# Patient Record
Sex: MALE | Race: AMERICAN INDIAN OR ALASKA NATIVE
[De-identification: names, ages, dates, MRNs, and addresses within clinical notes are randomized per-mention and may not be internally consistent; named-entity substitution may affect disease eponyms.]

---

## 2017-07-15 ENCOUNTER — HOSPITAL ENCOUNTER (EMERGENCY)
Dept: HOSPITAL 42 - ED | Age: 27
LOS: 1 days | Discharge: HOME | End: 2017-07-16
Payer: COMMERCIAL

## 2017-07-15 VITALS — BODY MASS INDEX: 33 KG/M2
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
## 2017-07-15 DIAGNOSIS — S39.011A: Primary | ICD-10-CM

## 2017-07-15 PROCEDURE — 87086 URINE CULTURE/COLONY COUNT: CPT

## 2017-07-15 PROCEDURE — 99283 EMERGENCY DEPT VISIT LOW MDM: CPT

## 2017-07-15 PROCEDURE — 74176 CT ABD & PELVIS W/O CONTRAST: CPT

## 2017-07-15 PROCEDURE — 81001 URINALYSIS AUTO W/SCOPE: CPT

## 2017-07-15 PROCEDURE — 80053 COMPREHEN METABOLIC PANEL: CPT

## 2017-07-15 PROCEDURE — 85025 COMPLETE CBC W/AUTO DIFF WBC: CPT

## 2017-07-16 VITALS
HEART RATE: 68 BPM | TEMPERATURE: 98.3 F | DIASTOLIC BLOOD PRESSURE: 96 MMHG | RESPIRATION RATE: 17 BRPM | SYSTOLIC BLOOD PRESSURE: 130 MMHG | OXYGEN SATURATION: 99 %

## 2017-07-16 LAB
ALBUMIN SERPL-MCNC: 4.9 G/DL (ref 3–4.8)
ALBUMIN/GLOB SERPL: 1 {RATIO} (ref 1.1–1.8)
ALT SERPL-CCNC: 50 U/L (ref 7–56)
APPEARANCE UR: CLEAR
AST SERPL-CCNC: 43 U/L (ref 15–59)
BASOPHILS # BLD AUTO: 0.01 K/MM3 (ref 0–2)
BASOPHILS NFR BLD: 0.2 % (ref 0–3)
BILIRUB UR-MCNC: NEGATIVE MG/DL
BUN SERPL-MCNC: 17 MG/DL (ref 7–21)
CALCIUM SERPL-MCNC: 9.8 MG/DL (ref 8.4–10.5)
COLOR UR: YELLOW
EOSINOPHIL # BLD: 0.3 10*3/UL (ref 0–0.7)
EOSINOPHIL NFR BLD: 3.9 % (ref 1.5–5)
EPI CELLS #/AREA URNS HPF: (no result) /HPF (ref 0–5)
ERYTHROCYTE [DISTWIDTH] IN BLOOD BY AUTOMATED COUNT: 13.9 % (ref 11.5–14.5)
GFR NON-AFRICAN AMERICAN: > 60
GLUCOSE UR STRIP-MCNC: NEGATIVE MG/DL
GRANULOCYTES # BLD: 2.96 10*3/UL (ref 1.4–6.5)
GRANULOCYTES NFR BLD: 45.9 % (ref 50–68)
HGB BLD-MCNC: 13.5 GM/DL (ref 14–18)
LEUKOCYTE ESTERASE UR-ACNC: NEGATIVE LEU/UL
LYMPHOCYTES # BLD: 2.8 10*3/UL (ref 1.2–3.4)
LYMPHOCYTES NFR BLD AUTO: 42.7 % (ref 22–35)
MCH RBC QN AUTO: 26.4 PG (ref 25–35)
MCHC RBC AUTO-ENTMCNC: 32.8 G/DL (ref 31–37)
MCV RBC AUTO: 80.4 FL (ref 80–105)
MONOCYTES # BLD AUTO: 0.5 10*3/UL (ref 0.1–0.6)
MONOCYTES NFR BLD: 7.3 % (ref 1–6)
PH UR STRIP: 6 [PH] (ref 4.7–8)
PLATELET # BLD: 296 10^3/UL (ref 120–450)
PMV BLD AUTO: 10 FL (ref 7–11)
PROT UR STRIP-MCNC: (no result) MG/DL
RBC # BLD AUTO: 5.11 10^6/UL (ref 3.5–6.1)
RBC # UR STRIP: NEGATIVE /UL
RBC #/AREA URNS HPF: (no result) /HPF (ref 0–2)
SP GR UR STRIP: 1.02 (ref 1–1.03)
URINE NITRATE: NEGATIVE
UROBILINOGEN UR STRIP-ACNC: 0.2 E.U./DL
WBC # BLD AUTO: 6.4 10^3/UL (ref 4.5–11)
WBC #/AREA URNS HPF: (no result) /HPF (ref 0–6)

## 2017-07-16 NOTE — CT
PROCEDURE:  CT abdomen and pelvis dated 07/16/2017 



HISTORY:

Pain, kidney stone



COMPARISON:

None.



TECHNIQUE:

Contiguous axial images of the abdomen and pelvis performed without 

oral or intravenous contrast material. Additional 2 dimensional 

sagittal and coronal reformats generated. 



This CT exam was performed using one or more of the following dose 

reduction techniques: Automated exposure control, adjustment of the 

mA and/or kV according to patient size, and/or use of iterative 

reconstruction technique.



Total exam DLP = 739.220 mGy-cm.



FINDINGS:



LOWER THORAX:

Minimal bibasilar atelectasis/ scarring. No focal consolidation 

effusion or basilar pneumothorax. Tiny hiatal hernia.  Heart size 

within range of normal. No significant pericardial effusion.



LIVER:

All the liver is enlarged measuring nearly 20 cm in CC dimension.  

Mild diffuse fatty hepatic infiltration. Some areas of localized 

fatty sparing about the gallbladder fossa. 



GALLBLADDER AND BILE DUCTS:

Gallbladder is physiologically distended. No evidence of intraluminal 

gallbladder calculi. 



PANCREAS:

Evaluation of the pancreas is slightly limited due the lack of 

circulating intravenous contrast as well as oral contrast. No gross 

pancreatic abnormalities identified.



SPLEEN:

Unremarkable. No splenomegaly. 



ADRENALS:

Unremarkable. 



KIDNEYS AND URETERS:

The kidneys exhibit symmetric size.  No evidence of nephrolithiasis 

or hydronephrosis. No renal mass or collection 



BLADDER:

Urinary bladder is incompletely distended which may account for 

thick-walled appearance.  Muscular hypertrophy may contribute.  

Cystitis should be excluded with clinical correlation and urinalysis. 



REPRODUCTIVE:

Prostate and seminal vesicles appear unremarkable. 



APPENDIX:

Appendix is not seen with certainty however no obvious inflammatory 

changes right lower quadrant of the abdomen to suggest acute 

appendicitis. .



BOWEL:

Evaluation of the bowel is limited due to the lack of oral contrast 

material. The stomach is incompletely distended which may account for 

thick-walled appearance gastritis not excluded.  No evidence of acute 

mechanical bowel obstruction however there appears to be at a few 

loops of small bowel that exhibit fecalized content. 



There appears to be some mild submucosal fat deposition on within the 

cecum and proximal ascending colon.  Rule out sequela of mild chronic 

inflammatory process. 



PERITONEUM:

No fluid collection. No free air. Tiny fat containing umbilical 

hernia. Moderate-sized bilateral fat containing inguinal hernias. 



LYMPH NODES:

Unremarkable. No enlarged lymph nodes. 



VASCULATURE:

Unremarkable. No aortic aneurysm. 



BONES:

The visualized lower thoracic and lumbar spine segments are intact.  

No evidence of acute compression fractures no retropulsed fragments. 

. 



OTHER FINDINGS:

None. 



IMPRESSION:

Hepatomegaly with mild fatty hepatic infiltration. . 



There appears to be mild submucosal fat deposition within the cecum 

and proximal ascending colon; rule out sequela of chronic 

inflammation. There also appears to be at a few loops of small bowel 

that exhibit fecalized content possibly due to stasis. 



Appendix is not seen with any certainty however no obvious 

inflammatory changes to suggest acute appendicitis.

## 2017-07-16 NOTE — ED PDOC
Arrival/HPI





<KrystaEdison - Last Filed: 07/16/17 02:14>





<Liu,Weilin - Last Filed: 07/16/17 07:04>





- General


Chief Complaint: Abdominal Pain


Time Seen by Provider: 07/16/17 01:07





- History of Present Illness


Narrative History of Present Illness (Text): 





07/16/17 01:31


27 year old  male with no past medical history presents with 

pubic pain. Patient reports the pain started suddenly 2 weeks ago. The pain is 

sharp in quality, non radiating. It comes and goes and it is worse with 

standing up. He did not try taking any medications for this pain. Patient does 

not have pain or burning sensation during urination. Patient does not have 

penile discharges. Patient practices protective sex. He further denies having 

headache, fever, chills, shortness of breath, chest pain, nausea, vomiting or 

urinary symptoms.


 (Liu,Weilin)





Past Medical History





- Provider Review


Nursing Documentation Reviewed: Yes





- Infectious Disease


Hx of Infectious Diseases: None





- Psychiatric


Hx Substance Use: No





- Anesthesia


Hx Anesthesia: No





<Liu,Weilin - Last Filed: 07/16/17 07:04>





Family/Social History





- Physician Review


Nursing Documentation Reviewed: Yes


Family/Social History: No Known Family HX


Smoking Status: Never Smoked


Hx Alcohol Use: Yes


Hx Substance Use: No





<Liu,Weilin - Last Filed: 07/16/17 07:04>





Allergies/Home Meds





<KrystaEdison - Last Filed: 07/16/17 02:14>





<Liu,Weilin - Last Filed: 07/16/17 07:04>


Allergies/Adverse Reactions: 


Allergies





No Known Allergies Allergy (Verified 07/16/17 01:20)


 








Home Medications: 


 Home Meds











 Medication  Instructions  Recorded  Confirmed


 


No Known Home Med  07/16/17 07/16/17














Review of Systems





- Physician Review


All systems were reviewed & negative as marked: Yes





- Review of Systems


Constitutional: Normal.  absent: Fatigue, Weight Change


Eyes: Normal


ENT: Normal


Respiratory: Normal.  absent: SOB, Cough, Sputum


Cardiovascular: Normal.  absent: Chest Pain, Syncope


Gastrointestinal: Abdominal Pain (lower abdominal/pubic pain).  absent: 

Constipation, Nausea, Vomiting


Genitourinary Male: Normal.  absent: Dysuria, Frequency, Hematuria, Urinary 

Output Changes


Musculoskeletal: Normal


Skin: Normal.  absent: Pruritis, Laceration


Neurological: Normal.  absent: Headache, Dizziness


Endocrine: Normal


Hemo/Lymphatic: Normal


Psychiatric: Normal





<Liu,Weilin - Last Filed: 07/16/17 07:04>





Physical Exam


Vital Signs Reviewed: Yes


Temperature: Afebrile


Blood Pressure: Normal


Pulse: Regular


Respiratory Rate: Normal


Appearance: Positive for: Well-Appearing, Non-Toxic, Comfortable


Pain Distress: None


Mental Status: Positive for: Alert and Oriented X 3





- Systems Exam


Head: Present: Atraumatic, Normocephalic


Pupils: Present: PERRL


Extroacular Muscles: Present: EOMI


Conjunctiva: Present: Normal


Mouth: Present: Moist Mucous Membranes


Neck: Present: Normal Range of Motion


Respiratory/Chest: Present: Clear to Auscultation, Good Air Exchange.  No: 

Respiratory Distress, Accessory Muscle Use


Cardiovascular: Present: Regular Rate and Rhythm, Normal S1, S2.  No: Murmurs


Abdomen: Present: Normal Bowel Sounds.  No: Tenderness, Distention, Peritoneal 

Signs


Back: Present: Normal Inspection.  No: CVA Tenderness


Upper Extremity: Present: Normal Inspection.  No: Cyanosis, Edema


Lower Extremity: Present: Normal Inspection.  No: Edema


Neurological: Present: GCS=15, CN II-XII Intact, Speech Normal


Skin: Present: Warm, Dry, Normal Color.  No: Rashes


Psychiatric: Present: Alert, Oriented x 3, Normal Insight, Normal Concentration





<Liu,Weilin - Last Filed: 07/16/17 07:04>


Vital Signs











  Temp Pulse Resp BP Pulse Ox


 


 07/16/17 06:51  98.3 F  68  17  130/96 H  99














Medical Decision Making





- Lab Interpretations


I have reviewed the lab results: Yes





<Edison Chaparro - Last Filed: 07/16/17 02:14>





- Lab Interpretations


I have reviewed the lab results: Yes





<Liu,Weilin - Last Filed: 07/16/17 07:04>


ED Course and Treatment: 


Impression:


Pt seen and evaluated with medical resident. Pt who presents to the ED 

complaining of intermittent pubic pain x 2 weeks, worsened with movement. Aware 

and agree with HPI, clinical findings, plan, and management.





Plan:


-- Labs


-- Urinalysis


-- IV fluids


-- Reassess and disposition (Edison Chaparro)





07/16/17 02:07


-CBC, CMP


-Urinalysis


-Urine culture


-IVF


-CT abdomen


-Reassess and disposition





DDx: Nephrolithiasis, UTI, gastroenteritis





Patient's pubic pain improved. Based on history and physical examination, 

patient's symptoms are likely muscle strain in nature. (Liu,Weilin)





- Lab Interpretations


Lab Results: 








 07/16/17 02:00 





 07/16/17 02:00 





 Lab Results





07/16/17 02:00: Sodium 140, Potassium 3.9, Chloride 100, Carbon Dioxide 27, 

Anion Gap 17, BUN 17, Creatinine 1.1, Est GFR (African Amer) > 60, Est GFR (Non-

Af Amer) > 60, Random Glucose 87, Calcium 9.8, Total Bilirubin 0.5, AST 43, ALT 

50, Alkaline Phosphatase 70, Total Protein 9.2 H, Albumin 4.9 H, Globulin 4.5, 

Albumin/Globulin Ratio 1.0 L


07/16/17 02:00: Urine Color Yellow, Urine Appearance Clear, Urine pH 6.0, Ur 

Specific Gravity 1.025, Urine Protein Trace H, Urine Glucose (UA) Negative, 

Urine Ketones Negative, Urine Blood Negative, Urine Nitrate Negative, Urine 

Bilirubin Negative, Urine Urobilinogen 0.2, Ur Leukocyte Esterase Negative, 

Urine RBC 0 - 2, Urine WBC 0 - 2, Ur Epithelial Cells 0 - 2


07/16/17 02:00: WBC 6.4, RBC 5.11, Hgb 13.5 L, Hct 41.1 L, MCV 80.4, MCH 26.4, 

MCHC 32.8, RDW 13.9, Plt Count 296, MPV 10.0, Gran % 45.9 L, Lymph % (Auto) 

42.7 H, Mono % (Auto) 7.3 H, Eos % (Auto) 3.9, Baso % (Auto) 0.2, Gran # 2.96, 

Lymph # 2.8, Mono # 0.5, Eos # 0.3, Baso # 0.01











- RAD Interpretation


Narrative RAD Interpretations (Text): 





07/16/17 06:28


EXAM:


CT Abdomen and Pelvis Without Intravenous Contrast


CLINICAL HISTORY:


27 years old, male; Pain; Abdominal pain; Other: Pelvic pain; Additional info: 

Pubic pain, kidney


stone


TECHNIQUE:


Axial computed tomography images of the abdomen and pelvis without intravenous 

contrast. This


CT exam was performed using one or more of the following dose reduction 

techniques: automated


exposure control, adjustment of the mA and/or kV according to patient size, and/

or use of iterative


reconstruction technique.


Coronal and sagittal reformatted images were created and reviewed.


COMPARISON:


No relevant prior studies available.


FINDINGS:


Lower thorax: The bilateral lung bases are clear.


ABDOMEN:


Liver: There is a decreased attenuation, consistent with fatty infiltration. 

Moderate hepatic


enlargement is also noted, measuring 20 cm in longitudinal dimension.


Gallbladder and bile ducts: No acute finding. No calcified stones. No intra-

extrahepatic biliary ductal


dilation.


Pancreas: Limited evaluation secondary to the lack of intravenous contrast.


Spleen: No acute findings.


Adrenals: No acute findings.


Kidneys and ureters: No obstructing stones. No hydronephrosis.


PELVIS:


Bladder: The bladder is decompressed.


Reproductive: No acute findings.


Appendix: The appendix is not definitively visualized, however no pericecal 

inflammatory changes


identified to suggest the presence of acute appendicitis.


ABDOMEN and PELVIS:


Stomach and bowel: Bilateral fat-containing inguinal hernias are present.Small 

bowel wall thickening


but no surrounding inflammation or fluid to confirm an acute enteritis.


Peritoneum: No acute findings.


Lymph nodes: Limited evaluation without intravenous contrast.


Vasculature: No aortic aneurysm.


Bones: No acute fracture.


IMPRESSION:


Mural thickening within multiple loops of small bowel, specifically within the 

left upper quadrant


without surrounding inflammation or fluid to confirm an acute enteritis.


Fatty infiltration of an enlarged liver.


Bilateral fat-containing inguinal hernias.


No obstructive uropathy.


 (Liu,Weilin)


Radiology Orders: 








07/16/17 04:22


ABDOMEN & PELVIS [ABD & PELVIS W/O PO OR IV CONT] [CT] Stat 














- Medication Orders


Current Medication Orders: 








Sodium Chloride (Sodium Chloride 0.9%)  1,000 mls @ 100 mls/hr IV .Q10H GIBRAN


   Last Admin: 07/16/17 02:17  Dose: 100 mls/hr











- PA / NP / Resident Statement


MARISOL has reviewed & agrees with the documentation as recorded.


MD/ has examined the patient and agrees with the treatment plan.





<Edison Chaparro - Last Filed: 07/16/17 02:14>





- PA / NP / Resident Statement


MARISOL has reviewed & agrees with the documentation as recorded.


MARISOL has examined the patient and agrees with the treatment plan.





<Liu,Weilin - Last Filed: 07/16/17 07:04>





Disposition/Present on Arrival





<Edison Chaparro - Last Filed: 07/16/17 02:14>





- Present on Arrival


Any Indicators Present on Arrival: No


History of DVT/PE: No


History of Uncontrolled Diabetes: No


Urinary Catheter: No


History of Decub. Ulcer: No


History Surgical Site Infection Following: None





- Disposition


Have Diagnosis and Disposition been Completed?: Yes


Disposition Time: 06:41





<Liu,Weilin - Last Filed: 07/16/17 07:04>





- Disposition


Diagnosis: 


 Muscle strain





Disposition: HOME/ ROUTINE


Patient Problems: 


 Current Active Problems











Problem Status Onset


 


Muscle strain Acute  











Condition: IMPROVED


Discharge Instructions (ExitCare):  Muscle Strain (ED)


Additional Instructions: 





Eber Roa, thank you for letting us take care of you today. Your provider 

was Dr. Chaparro. You were treated for muscle strain. The emergency medical 

care you received today was directed at your acute symptoms. If you were 

prescribed any medication, please fill it and take as directed. It may take 

several days for your symptoms to resolve. Return to the Emergency Department 

if your symptoms worsen, do not improve, or if you have any other problems.





Please contact your doctor or call one of the physicians/clinics you have been 

referred to that are listed on the Patient Visit Information form that is 

included in your discharge packet. Bring any paperwork you were given at 

discharge with you along with any medications you are taking to your follow up 

visit. Our treatment cannot replace ongoing medical care by a primary care 

provider (PCP) outside of the emergency department.





Please avoid strenuous activity such as heavy lifting and also to follow up 

outpatient clinic at [phone] or PMD of choice.





Thank you for allowing the [facility] team to be part of your care today.

## 2018-06-18 ENCOUNTER — HOSPITAL ENCOUNTER (EMERGENCY)
Dept: HOSPITAL 42 - ED | Age: 28
Discharge: HOME | End: 2018-06-18
Payer: SELF-PAY

## 2018-06-18 VITALS — BODY MASS INDEX: 31.7 KG/M2

## 2018-06-18 VITALS
SYSTOLIC BLOOD PRESSURE: 142 MMHG | DIASTOLIC BLOOD PRESSURE: 90 MMHG | OXYGEN SATURATION: 99 % | HEART RATE: 89 BPM | TEMPERATURE: 98.3 F

## 2018-06-18 VITALS — RESPIRATION RATE: 18 BRPM

## 2018-06-18 DIAGNOSIS — R10.32: Primary | ICD-10-CM

## 2018-06-18 NOTE — ED PDOC
Arrival/HPI





- General


Chief Complaint: Groin Pain


Time Seen by Provider: 06/18/18 18:16


Historian: Patient





- History of Present Illness


Narrative History of Present Illness (Text): 





06/18/18 18:21


29yo male with no pmhx  who present with complaint of left sided groin swelling/

pain x months. Notes pain is intermittent. States he has been busy with work, 

but decided to come in to ED today for evaluation. States the swelling/bulging 

is usually with lifting, coughing or sneezing. He denies any current swelling 

or pain to the area, states it was swollen and painful few days ago. He denies 

nausea, vomiting, diarrhea, fever, chills, nausea, back pain, abdominal pain, 

any other complaint.





Past Medical History





- Provider Review


Nursing Documentation Reviewed: Yes





- Infectious Disease


Hx of Infectious Diseases: None





- Psychiatric


Hx Substance Use: No





- Anesthesia


Hx Anesthesia: No





Family/Social History





- Physician Review


Nursing Documentation Reviewed: Yes


Family/Social History: Unknown Family HX


Smoking Status: Never Smoked


Hx Alcohol Use: Yes


Hx Substance Use: No





Allergies/Home Meds


Allergies/Adverse Reactions: 


Allergies





No Known Allergies Allergy (Verified 07/16/17 01:20)


 








Home Medications: 


 Home Meds











 Medication  Instructions  Recorded  Confirmed


 


No Known Home Med  07/16/17 06/18/18














Review of Systems





- Physician Review


All systems were reviewed & negative as marked: Yes





- Review of Systems


Constitutional: Normal


Eyes: Normal


ENT: Normal


Respiratory: Normal


Cardiovascular: Normal


Gastrointestinal: Normal


Genitourinary Male: Other (LEft sided groin pain).  absent: Dysuria, Frequency, 

Hematuria


Musculoskeletal: Normal


Skin: Normal


Neurological: Normal


Endocrine: Normal


Hemo/Lymphatic: Normal


Psychiatric: Normal





Physical Exam


Vital Signs Reviewed: Yes


Vital Signs











  Temp Pulse Resp BP Pulse Ox


 


 06/18/18 18:54  98.3 F  89  18  142/90  99


 


 06/18/18 18:14  98.4 F  97 H  18  144/95 H  97











Temperature: Afebrile


Blood Pressure: Normal


Pulse: Regular


Respiratory Rate: Normal


Appearance: Positive for: Well-Appearing, Non-Toxic, Comfortable


Pain Distress: None


Mental Status: Positive for: Alert and Oriented X 3





- Systems Exam


Head: Present: Atraumatic, Normocephalic


Pupils: Present: PERRL


Extroacular Muscles: Present: EOMI


Conjunctiva: Present: Normal


Mouth: Present: Moist Mucous Membranes


Neck: Present: Normal Range of Motion


Respiratory/Chest: Present: Clear to Auscultation, Good Air Exchange.  No: 

Respiratory Distress, Accessory Muscle Use


Cardiovascular: Present: Regular Rate and Rhythm, Normal S1, S2.  No: Murmurs


Abdomen: Present: Other (Soft).  No: Tenderness, Distention, Peritoneal Signs, 

Rebound, Guarding, McBurney's Point Tender, Rovsing's Sign Present, Hernias


Back: Present: Normal Inspection


Upper Extremity: Present: Normal Inspection.  No: Cyanosis, Edema


Lower Extremity: Present: Normal Inspection.  No: Edema


Neurological: Present: GCS=15, CN II-XII Intact, Speech Normal


Skin: Present: Warm, Dry, Normal Color.  No: Rashes


Psychiatric: Present: Alert, Oriented x 3, Normal Insight, Normal Concentration





Medical Decision Making


ED Course and Treatment: 





06/18/18 20:16


PT PE was benign. No bulging. No tenderness. No any finding.  He likely have 

inguinal hernia that bulges with pressure. He was advised to avoid lifting 

heavy objects. Referred to a surgeon for outpt evaluation. 





Disposition/Present on Arrival





- Present on Arrival


Any Indicators Present on Arrival: No


History of DVT/PE: No


History of Uncontrolled Diabetes: No


Urinary Catheter: No


History of Decub. Ulcer: No


History Surgical Site Infection Following: None





- Disposition


Have Diagnosis and Disposition been Completed?: Yes


Diagnosis: 


 Groin pain





Disposition: HOME/ ROUTINE


Disposition Time: 18:30


Patient Plan: Discharge


Condition: STABLE


Discharge Instructions (ExitCare):  Acute Abdomen (Belly Pain), Adult (DC)


Additional Instructions: 


Follow up with your Doctor/Surgeon


Return to ED for any new or worsening symptoms


Referrals: 


Brendan Lin MD [Medical Doctor] - Follow up with primary


[facility] [Outside] - Follow up with primary


Forms:  [employer] (English)

## 2018-08-08 ENCOUNTER — HOSPITAL ENCOUNTER (OUTPATIENT)
Dept: HOSPITAL 31 - C.SDS | Age: 28
Discharge: HOME | End: 2018-08-08
Attending: SURGERY
Payer: COMMERCIAL

## 2018-08-08 VITALS — HEART RATE: 72 BPM | TEMPERATURE: 98.9 F | DIASTOLIC BLOOD PRESSURE: 64 MMHG | SYSTOLIC BLOOD PRESSURE: 109 MMHG

## 2018-08-08 VITALS — BODY MASS INDEX: 31.7 KG/M2

## 2018-08-08 VITALS — OXYGEN SATURATION: 96 %

## 2018-08-08 VITALS — RESPIRATION RATE: 16 BRPM

## 2018-08-08 DIAGNOSIS — E66.01: ICD-10-CM

## 2018-08-08 DIAGNOSIS — K40.20: Primary | ICD-10-CM

## 2018-08-08 DIAGNOSIS — D17.6: ICD-10-CM

## 2018-08-08 PROCEDURE — 55520 REMOVAL OF SPERM CORD LESION: CPT

## 2018-08-08 PROCEDURE — 49650 LAP ING HERNIA REPAIR INIT: CPT

## 2018-08-08 PROCEDURE — 88302 TISSUE EXAM BY PATHOLOGIST: CPT

## 2018-08-08 PROCEDURE — 64488 TAP BLOCK BI INJECTION: CPT

## 2018-08-08 NOTE — PCM.SURG1
Surgeon's Initial Post Op Note





- Surgeon's Notes


Surgeon: BRANDI Sanchez MD


Assistant: MARY ANN Zuluaga


Type of Anesthesia: General Endo


Anesthesia Administered By: NOEMY


Pre-Operative Diagnosis: Bilateral Inguinal hernia.  Morbid Obesity


Operative Findings: Right Indirect Inguinal hernia.  Left Incarcerated inguinal 

hernia.  Morbid obesity.  B/L Lipoma of Cord


Post-Operative Diagnosis: Right Indirect Inguinal hernia.  Left Incarcerated 

inguinal hernia.  Morbid obesity.  B/L Lipoma of Cord


Operation Performed: Robotic Right Inguinal hernia repair with mesh.  Robotic 

left Incarcerated Inguinal hernia repair with mesh.  Robotic Excision of lipoma 

of speramatic Cord.  Lap TAP block Bilaterally


Specimen/Specimens Removed: Right Lipoma of cord.  Left Lipoma of cord


Estimated Blood Loss: EBL {In ML}: 10


Blood Products Given: N/A


Drains Used: No Drains


Post-Op Condition: Good


Date of Surgery/Procedure: 08/08/18


Time of Surgery/Procedure: 12:06

## 2018-08-09 NOTE — OP
Copied To:  Yamil Sanchez MD

Attending MD:  Yamil Sanchez MD



PROCEDURE DATE:  08/08/2018



PREOPERATIVE DIAGNOSES:

1.  Bilateral inguinal hernia.

2.  Morbid obesity.



POSTOPERATIVE DIAGNOSES:

1.  Right indirect inguinal hernia.

2.  Left incarcerated indirect inguinal hernia.

3.  Lipoma of the spermatic cord bilaterally.

4.  Morbid obesity.



PROCEDURES DONE:

1.  Robotic right inguinal hernia repair with a mesh.

2.  Robotic left incarcerated inguinal hernia repair with a mesh.

3.  Robotic excision of lipoma of the cord bilaterally.

4.  Laparoscopic transversus abdominis plane block placement.



SURGEON:  The procedure was done by Yamil chavarria MD.



ASSISTANT:  JAVIER Zuluaga



ANESTHESIA:  General endotracheal tube anesthesia.



ESTIMATED BLOOD LOSS:    Around 10 mL.



DRAINS:  None.



PATHOLOGY:  The lipoma of the cord was sent for the pathology.



COMPLICATIONS:  None.



INTRAOPERATIVE FINDINGS:  The patient had incarcerated left inguinal hernia

and indirect right inguinal hernia, and the patient had lipoma of the cord

bilaterally, and extensively morbid omentum.



DESCRIPTION OF PROCEDURE:  On intraoperative steps, this is a 28-year-old

male who was diagnosed with bilateral inguinal hernia, and the patient was

consented for the robotic right inguinal hernia repair with the mesh and

brought to the OR, placed supine on the operating table.  After induction

of the anesthesia, the abdomen was prepped and draped in usual sterile

fashion.  Supraumbilical transverse incision was made after incising the

skin, the subcutaneous tissue.  The robotic camera port was placed.  Pneumo

was created.  Another 3/8 mm port was placed and robot was brought in. 

Camera arm as well as arm 1 and arm 2 were docked.  First, the incarcerated

omentum in the left side was reduced, and lysis of adhesion was done.  On

the right side, the patient had indirect inguinal hernia, and the

peritoneum was incised from the left ASIS up to the right ASIS, and

medially the dissection was carried down up to the space of Retzius,

laterally on the right side, the peritoneum was dissected from the lateral

abdominal wall and dissection was carried down to reduce the hernial sac. 

The vas deferens and spermatic cord vessels were .   The sac

appeared to be complete and the sac was divided distally.  The sac was

reduced back into the peritoneal cavity.  Inferior dissection was done up

to the pelvic brim.  Now, the left side peritoneal dissection was done, and

the vas deferens and spermatic cord vessels were dissected.  The left side

sac also dissected distally, and after complete separation from the

spermatic cord vessels and the vas deferens, the sac was divided.  The sac

was reduced back into the peritoneal cavity and inferior dissection was

done up to the pelvic brim.  Now the right anatomical mesh was placed, and

then the left anatomical mesh was placed, and after proper implantation of

the mesh, the peritoneum was sutured with 0 Vicryl as well as 3-0 PDS V-Loc

continuous suture, and now the specimen was sent off the table for the

pathology, and the procedure was converted to laparoscopy.  Bilateral TAP

block was given left and right side, 30:30 mL of ______ was given in

transverse abdominis muscles plane block, and after that, the pneumo was

deflated.  Umbilical port site was closed in two layers, the fascia with

0-Vicryl interrupted suture, skin with 4-0 Monocryl, and all the port was

closed in the same way.  A dry sterile dressing was applied.  The patient

was extubated in OR, sent to the postanesthesia care unit in stable

condition.





__________________________________________

Yamil Sanchez MD



DD:  08/08/2018 16:06:14

DT:  08/08/2018 16:09:55

Job # [id number]

## 2018-09-16 ENCOUNTER — HOSPITAL ENCOUNTER (EMERGENCY)
Dept: HOSPITAL 42 - ED | Age: 28
Discharge: HOME | End: 2018-09-16
Payer: COMMERCIAL

## 2018-09-16 VITALS — OXYGEN SATURATION: 98 %

## 2018-09-16 VITALS — BODY MASS INDEX: 31.7 KG/M2

## 2018-09-16 VITALS
TEMPERATURE: 97.7 F | DIASTOLIC BLOOD PRESSURE: 81 MMHG | SYSTOLIC BLOOD PRESSURE: 128 MMHG | RESPIRATION RATE: 16 BRPM | HEART RATE: 79 BPM
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
## 2018-09-16 DIAGNOSIS — N43.3: Primary | ICD-10-CM

## 2018-09-16 DIAGNOSIS — K91.873: ICD-10-CM

## 2018-09-16 LAB
ALBUMIN SERPL-MCNC: 4.6 G/DL
ALBUMIN/GLOB SERPL: 1 {RATIO}
ALT SERPL-CCNC: 45 U/L
APPEARANCE UR: CLEAR
AST SERPL-CCNC: 43 U/L
BACTERIA #/AREA URNS HPF: (no result) /[HPF]
BASOPHILS # BLD AUTO: 0.01 K/MM3
BASOPHILS NFR BLD: 0.2 %
BILIRUB UR-MCNC: NEGATIVE MG/DL
BUN SERPL-MCNC: 12 MG/DL
CALCIUM SERPL-MCNC: 9.5 MG/DL
COLOR UR: YELLOW
EOSINOPHIL # BLD: 0.2 10*3/UL
EOSINOPHIL NFR BLD: 4.5 %
ERYTHROCYTE [DISTWIDTH] IN BLOOD BY AUTOMATED COUNT: 14.1 %
GFR NON-AFRICAN AMERICAN: > 60
GLUCOSE UR STRIP-MCNC: NEGATIVE MG/DL
GRANULOCYTES # BLD: 2.31 10*3/UL
GRANULOCYTES NFR BLD: 46.7 %
HGB BLD-MCNC: 13.3 G/DL
LEUKOCYTE ESTERASE UR-ACNC: NEGATIVE LEU/UL
LYMPHOCYTES # BLD: 2.2 10*3/UL
LYMPHOCYTES NFR BLD AUTO: 43.7 %
MCH RBC QN AUTO: 25.5 PG
MCHC RBC AUTO-ENTMCNC: 31.9 G/DL
MCV RBC AUTO: 79.9 FL
MONOCYTES # BLD AUTO: 0.2 10*3/UL
MONOCYTES NFR BLD: 4.9 %
PH UR STRIP: 6 [PH]
PLATELET # BLD: 307 10^3/UL
PMV BLD AUTO: 9.4 FL
PROT UR STRIP-MCNC: (no result) MG/DL
RBC # BLD AUTO: 5.22 10^6/UL
RBC # UR STRIP: NEGATIVE /UL
RBC #/AREA URNS HPF: NEGATIVE /HPF
SP GR UR STRIP: 1.02
UROBILINOGEN UR STRIP-ACNC: 0.2 E.U./DL
WBC # BLD AUTO: 4.9 10^3/UL

## 2018-09-16 PROCEDURE — 74176 CT ABD & PELVIS W/O CONTRAST: CPT

## 2018-09-16 PROCEDURE — 96372 THER/PROPH/DIAG INJ SC/IM: CPT

## 2018-09-16 PROCEDURE — 80053 COMPREHEN METABOLIC PANEL: CPT

## 2018-09-16 PROCEDURE — 93975 VASCULAR STUDY: CPT

## 2018-09-16 PROCEDURE — 85025 COMPLETE CBC W/AUTO DIFF WBC: CPT

## 2018-09-16 PROCEDURE — 87086 URINE CULTURE/COLONY COUNT: CPT

## 2018-09-16 PROCEDURE — 99284 EMERGENCY DEPT VISIT MOD MDM: CPT

## 2018-09-16 PROCEDURE — 81001 URINALYSIS AUTO W/SCOPE: CPT

## 2018-09-16 NOTE — US
Date of service: 



09/16/2018



HISTORY:

testicular pain



TECHNIQUE:

Realtime sonography through the scrotum with color and doppler flow.



COMPARISON:

None Available.



FINDINGS:



RIGHT TESTICLE:

Measures 4.1 1 x 2.52 x 3.01 cm. Normal echotexture and flow.



RIGHT EPIDIDYMIS:

Epididymal head measures 1.74 x 0.81 x 0.91 cm. Grossly unremarkable 

appearance with normal flow.



LEFT TESTICLE:

Measures 4.4 0 x 2.20 x 8 2.59 cm. Normal echotexture and flow.



LEFT EPIDIDYMIS:

Epididymal head measures 1.48 x 0.88 x 0.86 cm. There is a small 4 mm 

cyst in the epididymal head



HYDROCELE:

There is a large septated hydrocele on the right



VARICOCELE:

None.



OTHER FINDINGS:

None. 



IMPRESSION:

No evidence of torsion.



Large septated hydrocele on the right

## 2018-09-16 NOTE — CP.PCM.CON
<Beatriz Davis - Last Filed: 09/16/18 20:47>





History of Present Illness





- History of Present Illness


History of Present Illness: 





General Surgery Consult for Dr. Lin


CC: Right scrotal swelling





HPI: 28 year old male with PSH of robotic assisted laparoscopic bilateral 

inguinal hernia repair with mesh approximately 5 weeks ago, complaining of 

right scrotal swelling that began two days ago. Patient states that after the 

operation he did not note any swelling or pain in the area. Patient reports 

that he noticed tis swelling in the morning after having had sexual intercourse 

the previous evening, for the first time since the operation. The patient 

states that the swelling is constant, painless and has not fluctuated in size. 

Patient denies fevers, chills, abdominal pain, nausea, vomiting, diarrhea, 

constipation, dysuria, back pain and leg pain. Patient states he is passing gas 

and having regular bowel function.





PMH: denies


PSH: robotic assisted laparoscopic bilateral inguinal hernia repair 


Meds: alleve OTC for pain


Allergies: shellfish


FamHx: non-contributory


Social Hx: occasional tobacco, marijuana, alcohol use; lives alone; works in 

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





Review of Systems





- Review of Systems


All systems: reviewed and no additional remarkable complaints except (as per HPI

)





- Constitutional


Constitutional: absent: Chills, Excessive Sweating, Fever, Weight Gain, Weight 

Loss





- Cardiovascular


Cardiovascular: absent: Chest Pain





- Respiratory


Respiratory: absent: Cough, Dyspnea on Exertion





- Gastrointestinal


Gastrointestinal: absent: Abdominal Pain, Change in Bowel Habits, Change in 

Stool Character, Constipation, Diarrhea, Hematochezia, Loose Stools, Nausea, 

Vomiting





- Genitourinary


Genitourinary: absent: Change in Urinary Stream, Difficulty Urinating, Dysuria, 

Hematuria, Pyuria, Urinary Frequency, Urinary Urgency





- Musculoskeletal


Musculoskeletal: absent: Back Pain





- Integumentary


Integumentary: Swelling.  absent: Bleeding Lesions, Change in Pigmentation, 

Changing Lesions





Past Patient History





- Infectious Disease


Hx of Infectious Diseases: None





- Past Medical History & Family History


Past Medical History?: Yes


Past Family History: Reviewed and not pertinent





- Past Social History


Smoking Status: Current Some Days Smoker (2-3x/yr)


Alcohol: Occasional


Drugs: Cannabis (occasionally)


Home Situation {Lives}: Alone





- MUSCULOSKELETAL/RHEUMATOLOGICAL


Hx Musculoskeletal Disorders: Yes


Other/Comment: HX: BILATERAL INGUINAL HERNIA





- PSYCHIATRIC


Hx Substance Use: No





- SURGICAL HISTORY


Hx Surgeries: Yes


Other/Comment: BL laparoscopic inguinal hernia repair with mesh 8/2018





- ANESTHESIA


Hx Anesthesia: Yes


Hx Anesthesia Reactions: No


Hx Malignant Hyperthermia: No





Meds


Allergies/Adverse Reactions: 


 Allergies











Allergy/AdvReac Type Severity Reaction Status Date / Time


 


No Known Allergies Allergy   Verified 09/16/18 15:01














Physical Exam





- Constitutional


Appears: Well, Non-toxic, No Acute Distress





- Head Exam


Head Exam: ATRAUMATIC, NORMAL INSPECTION, NORMOCEPHALIC





- Eye Exam


Eye Exam: EOMI, Normal appearance.  absent: Scleral icterus





- ENT Exam


ENT Exam: Mucous Membranes Moist, Normal Oropharynx





- Respiratory Exam


Respiratory Exam: NORMAL BREATHING PATTERN.  absent: Accessory Muscle Use, 

Respiratory Distress





- Cardiovascular Exam


Cardiovascular Exam: RRR





- GI/Abdominal Exam


GI & Abdominal Exam: Soft.  absent: Distended, Firm, Guarding, Hernia, Rebound, 

Rigid, Tenderness


Additional comments: 





laparoscopic incisions well healed





-  Exam


 Exam: Scrotal Swelling (right scrotum with palpable, well defined, firm mass 

extending from proximal scrotum to 3-4cm proximal to the testicle. Did not 

communicate with the inguinal canal).  absent: Testicular Tenderness


External exam: Swelling.  absent: Erythema, Lacerations, Lesions


Additional comments: 





BL testicles normal size, no tenderness, no masses


No palpable hernia BL





- Extremities Exam


Extremities exam: Negative for: calf tenderness, joint swelling, pedal edema, 

tenderness





- Neurological Exam


Neurological exam: Alert, Oriented x3





- Psychiatric Exam


Psychiatric exam: Normal Affect, Normal Mood





- Skin


Skin Exam: Dry, Intact, Normal Color, Warm





Results





- Vital Signs


Recent Vital Signs: 


 Last Vital Signs











Temp  99.0 F   09/16/18 15:12


 


Pulse  78   09/16/18 18:47


 


Resp  18   09/16/18 18:47


 


BP  135/65   09/16/18 18:47


 


Pulse Ox  98   09/16/18 18:47














- Labs


Result Diagrams: 


 09/16/18 15:30





 09/16/18 15:30


Labs: 


 Laboratory Results - last 24 hr











  09/16/18 09/16/18 09/16/18





  15:30 15:30 15:30


 


WBC    4.9  D


 


RBC    5.22


 


Hgb    13.3 L


 


Hct    41.7 L


 


MCV    79.9 L


 


MCH    25.5


 


MCHC    31.9


 


RDW    14.1


 


Plt Count    307


 


MPV    9.4


 


Gran %    46.7 L


 


Lymph % (Auto)    43.7 H


 


Mono % (Auto)    4.9


 


Eos % (Auto)    4.5


 


Baso % (Auto)    0.2


 


Gran #    2.31


 


Lymph # (Auto)    2.2


 


Mono # (Auto)    0.2


 


Eos # (Auto)    0.2


 


Baso # (Auto)    0.01


 


Sodium   139 


 


Potassium   4.1 


 


Chloride   103 


 


Carbon Dioxide   26 


 


Anion Gap   15 


 


BUN   12 


 


Creatinine   0.9 


 


Est GFR ( Amer)   > 60 


 


Est GFR (Non-Af Amer)   > 60 


 


Random Glucose   92 


 


Calcium   9.5 


 


Total Bilirubin   0.4 


 


AST   43 


 


ALT   45 


 


Alkaline Phosphatase   93 


 


Total Protein   9.0 H 


 


Albumin   4.6 


 


Globulin   4.4 


 


Albumin/Globulin Ratio   1.0 L 


 


Urine Color  Yellow  


 


Urine Appearance  Clear  


 


Urine pH  6.0  


 


Ur Specific Gravity  1.025  


 


Urine Protein  Trace H  


 


Urine Glucose (UA)  Negative  


 


Urine Ketones  Negative  


 


Urine Blood  Negative  


 


Urine Nitrate  Negative  


 


Urine Bilirubin  Negative  


 


Urine Urobilinogen  0.2  


 


Ur Leukocyte Esterase  Negative  


 


Urine RBC  Negative  


 


Urine WBC  5 - 10  


 


Ur Epithelial Cells  3 - 4  


 


Urine Bacteria  Few  














- Imaging and Cardiology


  ** CT scan - abdomen


Status: Image reviewed by me, Report reviewed by me





  ** CT scan - pelvis


Status: Image reviewed by me, Report reviewed by me





Assessment & Plan





- Assessment and Plan (Free Text)


Assessment: 





28 year old male status post bilateral inguinal hernia repair with right 

scrotal swelling with fluid collection


Plan: 





-No surgical intervention at this time: no sign of infection, hernia recurrence

, scrotal tenderness, or compromise of testicular vascular supply.


-Follow up with Dr. Lin at clinic as an outpatient this week


-If symptoms worsen, return to ED


-Avoid aspirin for pain





Discussed with Dr. Lin, who agrees with above


Beatriz Davis PGY2





<Brendan Lin - Last Filed: 09/18/18 17:32>





Results





- Vital Signs


Recent Vital Signs: 


 Last Vital Signs











Temp  97.7 F   09/16/18 21:00


 


Pulse  79   09/16/18 21:00


 


Resp  16   09/16/18 21:00


 


BP  128/81   09/16/18 21:00


 


Pulse Ox  98   09/16/18 21:00














- Labs


Result Diagrams: 


 09/16/18 15:30





 09/16/18 15:30





Attending/Attestation





- Attestation


I have fully participated in the care of the patient.: Yes


I have reviewed all pertinent clinical information: Yes


Notes (Text): 





Pt with scrotal swelling and discomfort


Labs and radiology reviewed


Ass: Post op scrotal fluid collection


Pt can be DC home with Po antibiotics


f.u in office as out pt


Plan d.w pt in detail

## 2018-09-17 NOTE — CT
Date of service: 



09/16/2018



PROCEDURE:  CT Abdomen and Pelvis without intravenous contrast



HISTORY:

right groin pain/swelling



COMPARISON:

07/16/2017 and scrotal ultrasound performed same day



TECHNIQUE:

Without contrast.. 



Contrast dose: 



Radiation dose:



Total exam DLP = 864 mGy-cm.



This CT exam was performed using one or more of the following dose 

reduction techniques: Automated exposure control, adjustment of the 

mA and/or kV according to patient size, and/or use of iterative 

reconstruction technique.



FINDINGS:



LOWER THORAX:

Unremarkable. 



LIVER:

There is severe fatty infiltration of the liver  



GALLBLADDER AND BILE DUCTS:

Unremarkable. 



PANCREAS:

Unremarkable. No gross lesion or ductal dilatation.



SPLEEN:

Unremarkable. 



ADRENALS:

Unremarkable. No mass. 



KIDNEYS AND URETERS:

Unremarkable. No hydronephrosis. No solid mass. 



VASCULATURE:

Unremarkable. No aortic aneurysm. 



BOWEL:

Unremarkable. No obstruction. No gross mural thickening. 



APPENDIX:

Unremarkable. Normal appendix. 



PERITONEUM:

Unremarkable. No free fluid. No free air. 



LYMPH NODES:

Unremarkable. No enlarged lymph nodes. 



BLADDER:

Unremarkable. 



REPRODUCTIVE:

There is a large fluid collection in the right inguinal canal and 

upper scrotum measuring 6 cm in length and 4.3 cm in diameter. This 

could represent a complex hydrocele or possible abscess. The lesion 

appears septated on ultrasound 



BONES:

No acute fracture. 



OTHER FINDINGS:

The report concurs with the preliminary [facility] report



IMPRESSION:

There is a large fluid collection in the right inguinal canal and 

upper scrotum measuring 6 cm in length and 4.3 cm in diameter. This 

could represent a complex hydrocele or possible abscess. The lesion 

appears septated on ultrasound

## 2019-01-16 NOTE — ED PDOC
Arrival/HPI





- General


Chief Complaint: Male Genitourinary


Time Seen by Provider: 09/16/18 15:08


Historian: Patient





- History of Present Illness


Narrative History of Present Illness (Text): 





09/16/18 15:40


28-year-old male presents today with a 2 day history of right sided groin/

scrotal pain and swelling. pt denies trauma or injury but states the next 

morning after having sex he developed pain and swelling to the right side of 

scrotum. pt denies urinary symptoms. denies n/v/d/c/. no fever/chills. pt 

denies dizziness or weakness. pt states he has hx of recent bilateral inguinal 

hernia repair. pt denies pain at present time. denies penile discharge. denies 

back pain. no other complaints. 








Past Medical History





- Provider Review


Nursing Documentation Reviewed: Yes





- Travel History


Have you recently traveled outside [place] w/in the past 3 mons?: No





- Infectious Disease


Hx of Infectious Diseases: None





- Musculoskeletal/Rheumatological


Hx Musculoskeletal Disorders: Yes


Other/Comment: HX: BILATERAL INGUINAL HERNIA





- Psychiatric


Hx Substance Use: No





- Surgical History


Other/Comment: laparoscopy inguinal hernia removed 8/2018





- Anesthesia


Hx Anesthesia: Yes


Hx Anesthesia Reactions: No


Hx Malignant Hyperthermia: No





Family/Social History





- Physician Review


Nursing Documentation Reviewed: Yes


Family/Social History: Unknown Family HX


Smoking Status: Never Smoked


Hx Alcohol Use: Yes


Hx Substance Use: No





Allergies/Home Meds


Allergies/Adverse Reactions: 


Allergies





No Known Allergies Allergy (Verified 09/16/18 15:01)


 








Home Medications: 


 Home Meds











 Medication  Instructions  Recorded  Confirmed


 


No Known Home Med  07/16/17 09/16/18














Review of Systems





- Review of Systems


Respiratory: absent: SOB, Cough


Cardiovascular: absent: Chest Pain, Palpitations


Gastrointestinal: absent: Abdominal Pain, Constipation, Diarrhea, Nausea, 

Vomiting


Genitourinary Male: Other (+ right sided scrotal swelling/mass).  absent: 

Dysuria, Frequency, Hematuria, Urinary Output Changes


Musculoskeletal: absent: Arthralgias, Back Pain, Neck Pain


Skin: absent: Rash, Pruritis


Neurological: absent: Headache, Dizziness


Psychiatric: absent: Anxiety, Depression





Physical Exam


Vital Signs Reviewed: Yes


Vital Signs











  Temp Pulse Resp BP Pulse Ox


 


 09/16/18 21:00  97.7 F  79  16  128/81  98


 


 09/16/18 18:47   78  18  135/65  98


 


 09/16/18 15:12  99.0 F  86  18  139/53 L  98


 


 09/16/18 14:56  99 F  86  19  139/53 L  98











Temperature: Afebrile


Blood Pressure: Normal


Pulse: Regular


Respiratory Rate: Normal


Appearance: Positive for: Well-Appearing, Non-Toxic, Comfortable


Pain Distress: None


Mental Status: Positive for: Alert and Oriented X 3





- Systems Exam


Head: Present: Atraumatic


Mouth: Present: Moist Mucous Membranes


Neck: Present: Normal Range of Motion


Respiratory/Chest: Present: Clear to Auscultation, Good Air Exchange.  No: 

Respiratory Distress, Accessory Muscle Use


Cardiovascular: Present: Regular Rate and Rhythm, Normal S1, S2.  No: Murmurs


Abdomen: Present: Normal Bowel Sounds.  No: Tenderness, Distention, Peritoneal 

Signs, Rebound, Guarding


Genitourinary Male: Present: Normal External Genitalia, Other (there is a area 

of swelling noted to the right inguinal region, non tender, no erythema; no 

edema, no ecchymosis; chaparoned by dr. lazaro).  No: Penile Discharge, Testicle 

Tenderness, Penile Swelling, Testicle Swelling


Upper Extremity: Present: Normal ROM


Lower Extremity: Present: Normal ROM


Neurological: Present: GCS=15, Speech Normal


Skin: Present: Warm, Dry, Normal Color.  No: Rashes


Psychiatric: Present: Alert, Oriented x 3





Medical Decision Making


ED Course and Treatment: 





09/16/18 15:47


28yr old male with right scrotal swelling/mass; hx of hernia repair. 





duplex testicle; FINDINGS:


RIGHT TESTICLE:


Measures 4.1 1 x 2.52 x 3.01 cm. Normal echotexture and flow.


RIGHT EPIDIDYMIS:


Epididymal head measures 1.74 x 0.81 x 0.91 cm. Grossly unremarkable appearance 

with normal flow.


LEFT TESTICLE:


Measures 4.4 0 x 2.20 x 8 2.59 cm. Normal echotexture and flow.


LEFT EPIDIDYMIS:


Epididymal head measures 1.48 x 0.88 x 0.86 cm. There is a small 4 mm cyst in 

the epididymal head


HYDROCELE:


There is a large septated hydrocele on the right


VARICOCELE:


None.


OTHER FINDINGS:


None. 


IMPRESSION:


No evidence of torsion.


Large septated hydrocele on the right








cbc: wnl


cmp: wnl





Ua; wnl





ct abd/pelvis:FINDINGS:


Lung bases: Unremarkable. No mass. No consolidation.


ABDOMEN:


Liver: There is a diffuse moderate decrease in hepatic parenchymal density, 

consistent with


moderate fatty infiltration.


Gallbladder and bile ducts: Unremarkable. No calcified stones. No ductal 

dilation. No significant


wall thickening.


Pancreas: Unremarkable. No mass. No ductal dilation.


Spleen: Unremarkable. No splenomegaly.


Adrenals: Unremarkable. No mass.


Kidneys and ureters: The kidneys are normal. No hydronephrosis.


Stomach and bowel: Bowel loops appear within normal limits, no signs of wall 

thickening, mucosal


edema, or bowel distention.


PELVIS:


Appendix: The appendix is normal.


Bladder: Unremarkable. No mass.


Reproductive: Unremarkable as visualized.


ABDOMEN and PELVIS:


Intraperitoneal space: Unremarkable. No free air. No significant fluid 

collection.


Bones/joints: The spine, sacroiliac joints, and hip joints are normal. No acute 

fracture. No


dislocation.


Soft tissues: The cystic structure associated with the right inguinal canal 

distally near the scrotal sac.


This is partially visualized the density of 1.7 Hounsfield units, and a size of 

4.2 x 5.6 cm in diameter.


Vasculature: The aorta and IVC appear within normal limits. No abdominal aortic 

aneurysm.


Lymph nodes: Small reactive nodes in the inguinal region right more than left.


IMPRESSION:


Fluid collection or cystic lesion of the right inguinal canal distally near the 

right hemiscrotum.


Remaining scrotal contents included in the field-of-view. Presence of a small 

focal area of abscess in


this area cannot be excluded.


Consider correlation with testicular sonogram.


No evidence for bowel herniation, bowel obstruction, colitis, appendicitis or 

diverticulitis.








09/16/18 19:51


pt was seen and evaluated by  surgical resident dr. Davis; who discussed 

case with dr. Lin; they believe it is most likely a post surgical fluid 

collection. pt without signs of infection, without leukocytosis, less likely 

abscess. pt can f/u in the office, 





I discussed results in depth with patient and stressed the importance of follow-

up with the surgeon within the next 2 days. I advised immediate return if 

symptoms worsen persist or if new concerning symptoms develop: High fevers, 

increasing pain, increasing redness, increasing swelling or if any other 

concerning symptoms develop








Patient verbalizes understanding of discharge instructions and need for 

immediate followup.








all aspects of this case were discussed the attending of record. 








impression; hydrocele, fluid collection groin


increase fluids


follow up with the surgeon within the next 2 days. 


return immediately if symptoms worsen,persist or if new symptoms develop; high 

fevers, pain, redness, increasing swelling or if any other concerning symptoms 

develop. 











- Lab Interpretations


Lab Results: 








 09/16/18 15:30 





 09/16/18 15:30 





 Lab Results





09/16/18 15:30: WBC 4.9  D, RBC 5.22, Hgb 13.3 L, Hct 41.7 L, MCV 79.9 L, MCH 

25.5, MCHC 31.9, RDW 14.1, Plt Count 307, MPV 9.4, Gran % 46.7 L, Lymph % (Auto

) 43.7 H, Mono % (Auto) 4.9, Eos % (Auto) 4.5, Baso % (Auto) 0.2, Gran # 2.31, 

Lymph # (Auto) 2.2, Mono # (Auto) 0.2, Eos # (Auto) 0.2, Baso # (Auto) 0.01


09/16/18 15:30: Sodium 139, Potassium 4.1, Chloride 103, Carbon Dioxide 26, 

Anion Gap 15, BUN 12, Creatinine 0.9, Est GFR (African Amer) > 60, Est GFR (Non-

Af Amer) > 60, Random Glucose 92, Calcium 9.5, Total Bilirubin 0.4, AST 43, ALT 

45, Alkaline Phosphatase 93, Total Protein 9.0 H, Albumin 4.6, Globulin 4.4, 

Albumin/Globulin Ratio 1.0 L


09/16/18 15:30: Urine Color Yellow, Urine Appearance Clear, Urine pH 6.0, Ur 

Specific Gravity 1.025, Urine Protein Trace H, Urine Glucose (UA) Negative, 

Urine Ketones Negative, Urine Blood Negative, Urine Nitrate Negative, Urine 

Bilirubin Negative, Urine Urobilinogen 0.2, Ur Leukocyte Esterase Negative, 

Urine RBC Negative, Urine WBC 5 - 10, Ur Epithelial Cells 3 - 4, Urine Bacteria 

Few











- RAD Interpretation


Radiology Orders: 








09/16/18 15:08


TESTES DUPLEX COMPLETE [US] Stat 





09/16/18 15:43


ABD & PELVIS PO CONTRAST ONLY [CT] Stat 














- Medication Orders


Current Medication Orders: 











Discontinued Medications





Ketorolac Tromethamine (Toradol)  60 mg IM STAT STA


   Stop: 09/16/18 15:09


   Last Admin: 09/16/18 15:35  Dose: 60 mg





MAR Pain Assessment


 Document     09/16/18 15:35  [facility]  (Rec: 09/16/18 15:36  [facility]  [id number])


     Pain Reassessment


      Is this a pain reassessment?               No


     Sleep


      Is patient sleeping during reassessment?   No


     Presence of Pain


      Presence of Pain                           Yes


     Pain Scale Used


      Pain Scale Used                            Numeric


IM Administration Charges


 Document     09/16/18 15:35  [facility]  (Rec: 09/16/18 15:36  [facility]  [id number])


     Charges for Administration


      # of IM Administrations                    1














Disposition/Present on Arrival





- Present on Arrival


Any Indicators Present on Arrival: No


History of DVT/PE: No


History of Uncontrolled Diabetes: No


Urinary Catheter: No


History of Decub. Ulcer: No


History Surgical Site Infection Following: None





- Disposition


Have Diagnosis and Disposition been Completed?: Yes


Diagnosis: 


 Hydrocele, Seroma





Disposition: HOME/ ROUTINE


Disposition Time: 17:00


Patient Plan: Discharge


Condition: GOOD


Discharge Instructions (ExitCare):  Hydrocele


Additional Instructions: 


increase fluids


follow up with the surgeon within the next 2 days. 


return immediately if symptoms worsen,persist or if new symptoms develop; high 

fevers, pain, redness, increasing swelling or if any other concerning symptoms 

develop. 


Referrals: 


Brendan Lin MD [Medical Doctor] - Follow up with primary


Jennie Nixon MD [Medical Doctor] - Follow up with primary


 Service [Outside] - Follow up with primary


Forms:  [employer] (English), WORK NOTE Patient